# Patient Record
(demographics unavailable — no encounter records)

---

## 2025-06-20 NOTE — HISTORY OF PRESENT ILLNESS
[FreeTextEntry1] : Language: English Accompanied by: Wife Contact info: [phone] (wife's number) Referring Provider/PCP: Beatriz   Initial H&P 05/09/2024: Mr. JOHN is a very pleasant 68-year-old gentleman who presents today for initial evaluation of BPH.   C/o daytime frequency, q1h nocturia, weak stream, sensation of incomplete emptying, pelvic pain with voiding, particularly in the mornings, intermittency, incontinence, hesitancy. Denies GH, retention.  On tamsulosin. States that he has pelvic pain when he takes the tamsulosin, and it doesn't help with the stream.   Previously followed with a different urologist - had cysto and RBUS - was told everything normal.   Wife denies daily snoring (only snores when he's exceedingly tired).   Denies ED. No changes in firmness.   Denies baseline constipation.  --------------------------------------------------------------------------------------------------------------------------------------- Interval History 06/19/2025: Presents today for f/u. Last seen in the office 05/09/24.   At his last visit, I recommended he stop tamsulosin, given the associated pelvic pain with taking it.  He was started on daily tadalafil as an alternative.  Given that he still had a mild urge to void with a PVR of 52, I suspect that he may also have had some underlying pelvic floor dysfunction, and we discussed PFPT as an additional treatment option if daily tadalafil failed.  He also has a history of prior back trauma/neuropathy, which may be contributing to his overall voiding dysfunction, either indirectly by causing his pelvic floor dysfunction, versus direct neurologic effect on the bladder.  Today, he states that he tried the cialis, but he cannot recall why he stopped taking it.   LUTS have gotten worse.    Recently moved to [place] permanently.  --------------------------------------------------------------------------------------------------------------------------------------- PMHx: LE Neuropathy as a result of Spinal surgery, HTN, BPH, Chronic Low Back Pain PSHx: Spinal fusion, Carpal Tunnel, Open Appy SHx: denies x3, [employer] ([employer]), 100% disabled afterwards FHx: Mother - colon and bladder (unsure of primary), 2 Sisters - both breast, Brother - bladder, Brother - throat   All: PCN - hives/rash, Lisinopril - Angioedema --------------------------------------------------------------------------------------------------------------------------------------- Physical Exam: General: NAD, sitting on exam table comfortably HEENT: NCAT, EOMI Resp: breathing comfortably on RA, b/l symm chest rise Cardiac: RRR Abd: SNTND Back: (-) CVAT b/l MSK: HEIDI light/ FROM Psych: appropriate affect --------------------------------------------------------------------------------------------------------------------------------------- Results: PVR 06/19/25: 83cc 5/9/24: 52cc  PSA 04/28/2023: 1.37 06/09/2022: 1.63 03/18/2022: 1.76 04/09/2021: 1.39 03/11/2020: 1.39 --------------------------------------------------------------------------------------------------------------------------------------- A/P: 69M with mixed obstructive and irritative LUTS 2/2 BPH vs Pelvic Floor Dysfunction.   Exhibited adequate bladder emptying today with a PVR of 83cc.   1. BPH As previously discussed, I explained that his constellation of symptoms are likely due to BPH.  I explained to the pathophysiology and natural course of prostatic enlargement, and how it causes the symptoms he is experiencing.  We briefly touched upon the various treatment options for BPH, including but not limited to to p.o. medications, office-based procedures, and surgery.  Among the p.o. medications, we specifically discussed alpha-blocker therapy, 5-Phyllis, and daily tadalafil.    He was previously prescribed tadalafil, which he briefly took, but he cannot recall if it helped or if he had AEs.  He would like to try again and reassess.   2. Pelvic Floor Dysfunction We previously discussed PFPT.  Recommended again. He was interested.  Referral was provided, and he will take with him down to [place] and discuss with [facility].   I have answered all of his and his wife's questions, and I will see him for f/u as needed  Plan: - restart tadalafil - PFPT referral provided - f/u with  at [facility]  I spent a total of 32 minutes on face-to-face counseling, coordination of care, review of prior records and clinical documentation.

## 2025-06-20 NOTE — HISTORY OF PRESENT ILLNESS
[FreeTextEntry1] : Language: English Accompanied by: Wife Contact info: [phone] (wife's number) Referring Provider/PCP: Beatriz   Initial H&P 05/09/2024: Mr. JOHN is a very pleasant 68-year-old gentleman who presents today for initial evaluation of BPH.   C/o daytime frequency, q1h nocturia, weak stream, sensation of incomplete emptying, pelvic pain with voiding, particularly in the mornings, intermittency, incontinence, hesitancy. Denies GH, retention.  On tamsulosin. States that he has pelvic pain when he takes the tamsulosin, and it doesn't help with the stream.   Previously followed with a different urologist - had cysto and RBUS - was told everything normal.   Wife denies daily snoring (only snores when he's exceedingly tired).   Denies ED. No changes in firmness.   Denies baseline constipation.  --------------------------------------------------------------------------------------------------------------------------------------- Interval History 06/19/2025: Presents today for f/u. Last seen in the office 05/09/24.   At his last visit, I recommended he stop tamsulosin, given the associated pelvic pain with taking it.  He was started on daily tadalafil as an alternative.  Given that he still had a mild urge to void with a PVR of 52, I suspect that he may also have had some underlying pelvic floor dysfunction, and we discussed PFPT as an additional treatment option if daily tadalafil failed.  He also has a history of prior back trauma/neuropathy, which may be contributing to his overall voiding dysfunction, either indirectly by causing his pelvic floor dysfunction, versus direct neurologic effect on the bladder.  Today, he states that he tried the cialis, but he cannot recall why he stopped taking it.   LUTS have gotten worse.    Recently moved to [place] permanently.  --------------------------------------------------------------------------------------------------------------------------------------- PMHx: LE Neuropathy as a result of Spinal surgery, HTN, BPH, Chronic Low Back Pain PSHx: Spinal fusion, Carpal Tunnel, Open Appy SHx: denies x3, former [employer] ([employer]), 100% disabled afterwards FHx: Mother - colon and bladder (unsure of primary), 2 Sisters - both breast, Brother - bladder, Brother - throat   All: PCN - hives/rash, Lisinopril - Angioedema --------------------------------------------------------------------------------------------------------------------------------------- Physical Exam: General: NAD, sitting on exam table comfortably HEENT: NCAT, EOMI Resp: breathing comfortably on RA, b/l symm chest rise Cardiac: RRR Abd: SNTND Back: (-) CVAT b/l MSK: HEIDI light/ FROM Psych: appropriate affect --------------------------------------------------------------------------------------------------------------------------------------- Results: PVR 06/19/25: 83cc 5/9/24: 52cc  PSA 04/28/2023: 1.37 06/09/2022: 1.63 03/18/2022: 1.76 04/09/2021: 1.39 03/11/2020: 1.39 --------------------------------------------------------------------------------------------------------------------------------------- A/P: 69M with mixed obstructive and irritative LUTS 2/2 BPH vs Pelvic Floor Dysfunction.   Exhibited adequate bladder emptying today with a PVR of 83cc.   1. BPH As previously discussed, I explained that his constellation of symptoms are likely due to BPH.  I explained to the pathophysiology and natural course of prostatic enlargement, and how it causes the symptoms he is experiencing.  We briefly touched upon the various treatment options for BPH, including but not limited to to p.o. medications, office-based procedures, and surgery.  Among the p.o. medications, we specifically discussed alpha-blocker therapy, 5-Phyllis, and daily tadalafil.    He was previously prescribed tadalafil, which he briefly took, but he cannot recall if it helped or if he had AEs.  He would like to try again and reassess.   2. Pelvic Floor Dysfunction We previously discussed PFPT.  Recommended again. He was interested.  Referral was provided, and he will take with him down to [place] and discuss with [facility].   I have answered all of his and his wife's questions, and I will see him for f/u as needed  Plan: - restart tadalafil - PFPT referral provided - f/u with  at [facility]  I spent a total of 32 minutes on face-to-face counseling, coordination of care, review of prior records and clinical documentation.